# Patient Record
Sex: MALE | Race: ASIAN
[De-identification: names, ages, dates, MRNs, and addresses within clinical notes are randomized per-mention and may not be internally consistent; named-entity substitution may affect disease eponyms.]

---

## 2022-09-17 ENCOUNTER — HOSPITAL ENCOUNTER (EMERGENCY)
Dept: HOSPITAL 4 - SED | Age: 72
Discharge: HOME | End: 2022-09-17
Payer: MEDICARE

## 2022-09-17 VITALS — SYSTOLIC BLOOD PRESSURE: 161 MMHG

## 2022-09-17 VITALS — SYSTOLIC BLOOD PRESSURE: 142 MMHG

## 2022-09-17 DIAGNOSIS — Z79.899: ICD-10-CM

## 2022-09-17 DIAGNOSIS — R00.2: Primary | ICD-10-CM

## 2022-09-17 DIAGNOSIS — I10: ICD-10-CM

## 2022-09-17 LAB
ALBUMIN SERPL BCP-MCNC: 3.8 G/DL (ref 3.4–4.8)
ALT SERPL W P-5'-P-CCNC: 63 U/L (ref 12–78)
ANION GAP SERPL CALCULATED.3IONS-SCNC: 6 MMOL/L (ref 5–15)
AST SERPL W P-5'-P-CCNC: 34 U/L (ref 10–37)
BASOPHILS # BLD AUTO: 0 K/UL (ref 0–0.2)
BASOPHILS NFR BLD AUTO: 0.7 % (ref 0–2)
BILIRUB SERPL-MCNC: 0.5 MG/DL (ref 0–1)
BUN SERPL-MCNC: 26 MG/DL (ref 8–21)
CALCIUM SERPL-MCNC: 9.5 MG/DL (ref 8.4–11)
CHLORIDE SERPL-SCNC: 103 MMOL/L (ref 98–107)
CREAT SERPL-MCNC: 1.2 MG/DL (ref 0.55–1.3)
EOSINOPHIL # BLD AUTO: 0.2 K/UL (ref 0–0.4)
EOSINOPHIL NFR BLD AUTO: 3.3 % (ref 0–4)
ERYTHROCYTE [DISTWIDTH] IN BLOOD BY AUTOMATED COUNT: 12.8 % (ref 9–15)
GFR SERPL CREATININE-BSD FRML MDRD: (no result) ML/MIN (ref 90–?)
GLUCOSE SERPL-MCNC: 107 MG/DL (ref 70–99)
HCT VFR BLD AUTO: 43.6 % (ref 36–54)
LYMPHOCYTES # BLD AUTO: 2.4 K/UL (ref 1–5.5)
LYMPHOCYTES NFR BLD AUTO: 47.1 % (ref 20.5–51.5)
MCV RBC AUTO: 95 FL (ref 79–98)
MONOCYTES # BLD MANUAL: 0.6 K/UL (ref 0–1)
MONOCYTES # BLD MANUAL: 11.5 % (ref 1.7–9.3)
NEUTROPHILS # BLD AUTO: 1.9 K/UL (ref 1.8–7.7)
NEUTROPHILS NFR BLD AUTO: 37.4 % (ref 40–70)
PLATELET # BLD AUTO: 150 K/UL (ref 130–430)
POTASSIUM SERPL-SCNC: 4.4 MMOL/L (ref 3.5–5.1)
RBC # BLD AUTO: 4.59 MIL/UL (ref 4.2–6.2)
SODIUM SERPLBLD-SCNC: 141 MMOL/L (ref 136–145)
T4 FREE SERPL-MCNC: 1 NG/DL (ref 0.8–1.5)
TSH SERPL DL<=0.05 MIU/L-ACNC: 2.78 UIU/ML (ref 0.36–3.74)
WBC # BLD AUTO: 5.2 K/UL (ref 4.8–10.8)

## 2022-09-17 NOTE — NUR
AMBULATORY W/ STEADY GAIT TO AND FROM BATHROOM. PLACED BACK ON CARDIAC MONITOR. 
UA PROVIDED AND GIVEN TO . MONITOR SHOWING SINUS TACH  BPM. HE 
DENIES CHEST PAIN/DYPSNEA. /93. BED IN LOW POSITION, WHEELS LOCKED AND SR 
UP X 1 FOR SAFETY. NEPHEW AT BEDSIDE. WILL CONTINUE TO MONITOR.

## 2022-09-17 NOTE — NUR
ARRIVED TO ER #[id number] WITH STEADY GAIT ACCOMPANIED VIA NEPHEW C/O PALPITATIONS. 
ONSET BEGAN 1 HOUR AGO. NEPHEW STATES THEY CHECKED HR VIA PULSE OX AND IT WAS 
170'S. HE REPORTS C/O CHEST HEAVINESS AND PRESSURE WHEN HR INCREASES. HE ALSO 
C/O SOB. NEPHEW REPORTS HX OF SVT IN 2020. DENIES DIZZINESS, H/A, NEAR SYNCOPE. 
DRAPED IN GOWN AND PLACED ON CARDIAC MONITOR. CURRENTLY . DENIES BEING ON 
BLOOD THINNERS. REPORTS TAKING NORVASC 5MG DAILY AND LIPITOR 20 MG DAILY. 
REPORTS COMPLIANCE WITH MEDS. LYING SEMI FOWLERS ON STRETCHER RESTING 
COMFORTABLY. BED IN LOW POSITION, WHEELS LOCKED AND SR UP X 1 FOR SAFETY. WILL 
CONTINUE TO MONITOR. AWAITING MD BOLAÑOS.

## 2022-09-17 NOTE — NUR
Patient given written and verbal discharge instructions and verbalizes 
understanding.  ER MD discussed with patient the results and treatment 
provided. Patient in stable condition. ID arm band removed. Rx of ATENOLOL 
given. Patient educated on pain management and to follow up with PMD. Pain 
Scale .

Opportunity for questions provided and answered. Medication side effect fact 
sheet provided.